# Patient Record
Sex: MALE | Race: WHITE
[De-identification: names, ages, dates, MRNs, and addresses within clinical notes are randomized per-mention and may not be internally consistent; named-entity substitution may affect disease eponyms.]

---

## 2019-03-16 ENCOUNTER — HOSPITAL ENCOUNTER (EMERGENCY)
Dept: HOSPITAL 76 - ED | Age: 24
Discharge: HOME | End: 2019-03-16
Payer: COMMERCIAL

## 2019-03-16 VITALS — SYSTOLIC BLOOD PRESSURE: 142 MMHG | DIASTOLIC BLOOD PRESSURE: 71 MMHG

## 2019-03-16 DIAGNOSIS — S93.402A: Primary | ICD-10-CM
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
## 2019-03-16 PROCEDURE — 99283 EMERGENCY DEPT VISIT LOW MDM: CPT

## 2019-03-16 NOTE — ED PHYSICIAN DOCUMENTATION
PD HPI LOWER EXT INJURY





- Stated complaint


Stated Complaint: R ANKLE INJ





- Chief complaint


Chief Complaint: Trauma Ext





- History obtained from


History obtained from: Patient





- History of Present Illness


PD HPI LOW EXT INJURY LOCATION: Right, Ankle


Type of injury: Twist


Where injury occurred: Other (PT yesterday)


Timing - onset: Yesterday


Timing - details: Abrupt onset


Worsened by: Other (walking)


Associated symptoms: Swelling


Contributing factors: No: Prior ortho surgery





Review of Systems


Constitutional: reports: Reviewed and negative


Cardiac: reports: Reviewed and negative


Respiratory: reports: Reviewed and negative





PD PAST MEDICAL HISTORY





- Present Medications


Home Medications: 


                                Ambulatory Orders











 Medication  Instructions  Recorded  Confirmed


 


Ibuprofen [Motrin] 800 mg PO Q8H PRN #30 tablet 03/16/19 














- Allergies


Allergies/Adverse Reactions: 


                                    Allergies











Allergy/AdvReac Type Severity Reaction Status Date / Time


 


minocycline Allergy  Anaphylaxis Verified 03/16/19 12:10














PD ED PE NORMAL





- Vitals


Vital signs reviewed: Yes





- General


General: Alert and oriented X 3, No acute distress





- Extremities


Extremities: Other (Right leg, no proximal fibular tenderness.  There is 

tenderness that is significant over the lateral malleolus, not the medial 

malleolus.  Not so much the ATFL.  No foot tenderness, no calcaneal or Achilles 

tenderness.)





- Neuro


Neuro: Alert and oriented X 3, Normal speech





Results





- Vitals


Vitals: 


                               Vital Signs - 24 hr











  03/16/19





  12:08


 


Temperature 36.7 C


 


Heart Rate 91


 


Respiratory 14





Rate 


 


Blood Pressure 142/71 H


 


O2 Saturation 100








                                     Oxygen











O2 Source                      Room air

















Departure





- Departure


Disposition: 01 Home, Self Care


Clinical Impression: 


Right ankle sprain


Qualifiers:


 Encounter type: initial encounter Involved ligament of ankle: anterior 

talofibular ligament Qualified Code(s): S93.491A - Sprain of other ligament of 

right ankle, initial encounter





Condition: Good


Record reviewed to determine appropriate education?: Yes


Instructions:  ED Sprain Ankle W X Ray


Prescriptions: 


Ibuprofen [Motrin] 800 mg PO Q8H PRN #30 tablet


 PRN Reason: PAIN &/OR FEVER


Comments: 


Recheck with your doctor in a week if not better, return for new or worsening 

symptoms.  Ice and elevate.


Forms:  Activity restrictions

## 2019-03-16 NOTE — XRAY REPORT
Reason:  pain/injury yesterday

Procedure Date:  03/16/2019   

Accession Number:  [id number] / [id number]                    

Procedure:  XR  - Ankle 3 View RT CPT Code:  

 

FULL RESULT:

 

 

EXAM:

RIGHT ANKLE RADIOGRAPHY

 

EXAM DATE: 3/16/2019 12:50 PM.

 

CLINICAL HISTORY: Pain/injury yesterday.

 

COMPARISON: None.

 

TECHNIQUE: 3 views.

 

FINDINGS:

Bones: No fractures or bone lesions.

 

Joints: Suggestion of joint effusion. No subluxations. The ankle mortise 

is normally aligned.

 

Soft Tissues: Soft tissue swelling is present.

IMPRESSION:

1. No evidence of fracture.

2. Joint effusion and soft tissue swelling noted.

 

[facility]